# Patient Record
Sex: FEMALE | Race: WHITE | NOT HISPANIC OR LATINO | Employment: PART TIME | URBAN - METROPOLITAN AREA
[De-identification: names, ages, dates, MRNs, and addresses within clinical notes are randomized per-mention and may not be internally consistent; named-entity substitution may affect disease eponyms.]

---

## 2019-11-14 ENCOUNTER — OFFICE VISIT (OUTPATIENT)
Dept: OBSTETRICS AND GYNECOLOGY | Facility: CLINIC | Age: 44
End: 2019-11-14
Payer: COMMERCIAL

## 2019-11-14 VITALS
HEIGHT: 68 IN | SYSTOLIC BLOOD PRESSURE: 100 MMHG | BODY MASS INDEX: 21.95 KG/M2 | WEIGHT: 144.81 LBS | DIASTOLIC BLOOD PRESSURE: 68 MMHG

## 2019-11-14 DIAGNOSIS — R10.2 PELVIC PAIN IN FEMALE: Primary | ICD-10-CM

## 2019-11-14 DIAGNOSIS — N89.8 VAGINAL DISCHARGE: ICD-10-CM

## 2019-11-14 LAB
BILIRUB UR QL STRIP: NEGATIVE
CLARITY UR REFRACT.AUTO: CLEAR
COLOR UR AUTO: YELLOW
GLUCOSE UR QL STRIP: NEGATIVE
HGB UR QL STRIP: NEGATIVE
KETONES UR QL STRIP: NEGATIVE
LEUKOCYTE ESTERASE UR QL STRIP: NEGATIVE
NITRITE UR QL STRIP: NEGATIVE
PH UR STRIP: 5 [PH] (ref 5–8)
PROT UR QL STRIP: NEGATIVE
SP GR UR STRIP: 1.02 (ref 1–1.03)
URN SPEC COLLECT METH UR: NORMAL

## 2019-11-14 PROCEDURE — 99204 OFFICE O/P NEW MOD 45 MIN: CPT | Mod: S$GLB,,, | Performed by: OBSTETRICS & GYNECOLOGY

## 2019-11-14 PROCEDURE — 87801 DETECT AGNT MULT DNA AMPLI: CPT

## 2019-11-14 PROCEDURE — 87481 CANDIDA DNA AMP PROBE: CPT | Mod: 59

## 2019-11-14 PROCEDURE — 87491 CHLMYD TRACH DNA AMP PROBE: CPT | Mod: 59

## 2019-11-14 PROCEDURE — 99204 PR OFFICE/OUTPT VISIT, NEW, LEVL IV, 45-59 MIN: ICD-10-PCS | Mod: S$GLB,,, | Performed by: OBSTETRICS & GYNECOLOGY

## 2019-11-14 PROCEDURE — 87086 URINE CULTURE/COLONY COUNT: CPT

## 2019-11-14 PROCEDURE — 87661 TRICHOMONAS VAGINALIS AMPLIF: CPT

## 2019-11-14 PROCEDURE — 99999 PR PBB SHADOW E&M-NEW PATIENT-LVL III: CPT | Mod: PBBFAC,,, | Performed by: OBSTETRICS & GYNECOLOGY

## 2019-11-14 PROCEDURE — 99203 OFFICE O/P NEW LOW 30 MIN: CPT | Mod: PBBFAC | Performed by: OBSTETRICS & GYNECOLOGY

## 2019-11-14 PROCEDURE — 99999 PR PBB SHADOW E&M-NEW PATIENT-LVL III: ICD-10-PCS | Mod: PBBFAC,,, | Performed by: OBSTETRICS & GYNECOLOGY

## 2019-11-14 PROCEDURE — 81003 URINALYSIS AUTO W/O SCOPE: CPT

## 2019-11-14 RX ORDER — CAFFEINE CITRATE 20 MG/ML
SOLUTION ORAL DAILY
COMMUNITY

## 2019-11-14 RX ORDER — TOPIRAMATE 50 MG/1
50 TABLET, FILM COATED ORAL
COMMUNITY
Start: 2019-01-16

## 2019-11-14 RX ORDER — ATORVASTATIN CALCIUM 20 MG/1
20 TABLET, FILM COATED ORAL
COMMUNITY
Start: 2019-02-26 | End: 2020-02-26

## 2019-11-14 RX ORDER — TOPIRAMATE 100 MG/1
100 TABLET, FILM COATED ORAL
COMMUNITY
Start: 2019-01-16

## 2019-11-14 RX ORDER — LAMOTRIGINE 200 MG/1
200 TABLET ORAL
COMMUNITY
Start: 2019-01-16

## 2019-11-14 RX ORDER — PREDNISONE 5 MG/1
5 TABLET ORAL
COMMUNITY
Start: 2019-01-16

## 2019-11-14 RX ORDER — PROPRANOLOL HYDROCHLORIDE 120 MG/1
120 CAPSULE, EXTENDED RELEASE ORAL
COMMUNITY
Start: 2019-01-16

## 2019-11-14 RX ORDER — SERTRALINE HYDROCHLORIDE 100 MG/1
200 TABLET, FILM COATED ORAL
COMMUNITY
Start: 2019-11-05

## 2019-11-14 RX ORDER — MIDODRINE HYDROCHLORIDE 10 MG/1
TABLET ORAL
COMMUNITY
Start: 2019-10-24

## 2019-11-14 RX ORDER — FLUDROCORTISONE ACETATE 0.1 MG/1
0.2 TABLET ORAL
COMMUNITY
Start: 2019-01-16

## 2019-11-14 RX ORDER — NARATRIPTAN 2.5 MG/1
2.5 TABLET ORAL
COMMUNITY

## 2019-11-14 RX ORDER — ALPRAZOLAM 0.25 MG/1
0.25 TABLET ORAL 3 TIMES DAILY
COMMUNITY

## 2019-11-14 NOTE — PROGRESS NOTES
Urine Culture, Routine No growth         11/14/2019    Bacterial vaginosis DNA Negative   Candida glabrata DNA Negative   Candida krusei DNA Negative   Candida sp Negative   Chlamydia, Amplified DNA Not Detected   N gonorrhoeae, amplified DNA Not Detected   Trichomonas vaginalis Negative     11/14/19 U/S  Uterus:  Size: 7.1 x 2.9 x 3.6 cm  Masses: None  Endometrium: Normal in this pre menopausal patient, measuring 6 mm.  Right ovary:  Size: 3.9 x 1.8 x 3.0 cm  Appearance: Normal  Vascular flow: Normal.  Left ovary:  Size: 3.4 x 1.8 x 3.8 cm  Appearance: Normal  Vascular Flow: Normal  Free Fluid:  None.      Impression     No sonographic abnormality.       PT WAS FINE UNTIL SHE HAD KYLEANA PLACED 8/2019 AND REMOVED 10/2019.  HAD SPOTTING QD AND PAIN. NOW WITH MENSES SEVERE PAINS. HAD A CYST IN THE PAST AND THINKS IT MAY BE LIKE THAT BUT ON BOTH SIDES SHARP AND ALSO SHARP PAINS AT TOP OF THE VAGINA.  FELLS SOMETHING IS NOT RIGHT.   -N/ -V. -D/ -C.  -Uti sx.  -Vag d/c. - BRBPR.  -Dyschezia. UNKNOWN Dysparunia. ++++ Dysmenorhea.      ROS:  GENERAL: No fever, chills, fatigability or weight loss.  VULVAR: No pain, no lesions and no itching.  VAGINAL: No relaxation, no itching, no discharge, no abnormal bleeding and no lesions.  ABDOMEN: No abdominal pain. Denies nausea. Denies vomiting. No diarrhea. No constipation  BREAST: Denies pain. No lumps. No discharge.  URINARY: No incontinence, no nocturia, no frequency and no dysuria.  CARDIOVASCULAR: No chest pain. No shortness of breath. No leg cramps.  NEUROLOGICAL: No headaches. No vision changes.  The remainder of the review of systems was negative.    PE:  General Appearance: normal weight And Well developed. Well nourished. In no acute distress.  Vulva: Lesions: No.  Urethral Meatus: Normal size. Normal location. No lesions. No prolapse.  Urethra: No masses. No tenderness. No prolapse. No scarring.  Bladder: No masses. No tenderness.  Vagina: Mucosa NI:yes discharge yes,  atrophy no, cystocele no or rectocele no.  Cervix: Lesion: no  Stenotic: no Cervical motion tenderness: no  HIGH  Uterus: Uterus size: 5 weeks. Support good. Uterus size: Normal  Adnexa: Masses: No Tenderness: No CDS Nodularity: No  NO PAIN REPRODUCED  Abdomen: normal weight No masses. No tenderness.  CHEST: CTA B  HEART: RRR  EXT: NO EDEMA      PROCEDURES:  UPT-    PLAN:     DIAGNOSIS:  1. Pelvic pain in female    2. Vaginal discharge        MEDICATIONS & ORDERS:  Orders Placed This Encounter    Urine culture    C. trachomatis/N. gonorrhoeae by AMP DNA    Vaginosis Screen by DNA Probe    US Pelvis Comp with Transvag NON-OB (xpd    Urinalysis     20 MIN D/W PT ON CAUSES OF CPP AND W/U    FOLLOW-UP: With me PRN

## 2019-11-15 ENCOUNTER — HOSPITAL ENCOUNTER (OUTPATIENT)
Dept: RADIOLOGY | Facility: OTHER | Age: 44
Discharge: HOME | End: 2019-11-15
Attending: OBSTETRICS & GYNECOLOGY
Payer: COMMERCIAL

## 2019-11-15 DIAGNOSIS — R10.2 PELVIC PAIN IN FEMALE: ICD-10-CM

## 2019-11-15 LAB
BACTERIA UR CULT: NO GROWTH
BACTERIAL VAGINOSIS DNA: NEGATIVE
C TRACH DNA SPEC QL NAA+PROBE: NOT DETECTED
CANDIDA GLABRATA DNA: NEGATIVE
CANDIDA KRUSEI DNA: NEGATIVE
CANDIDA RRNA VAG QL PROBE: NEGATIVE
N GONORRHOEA DNA SPEC QL NAA+PROBE: NOT DETECTED
T VAGINALIS RRNA GENITAL QL PROBE: NEGATIVE

## 2019-11-15 PROCEDURE — 76830 TRANSVAGINAL US NON-OB: CPT | Mod: 26,,, | Performed by: RADIOLOGY

## 2019-11-15 PROCEDURE — 76856 US PELVIS COMP WITH TRANSVAG NON-OB (XPD): ICD-10-PCS | Mod: 26,,, | Performed by: RADIOLOGY

## 2019-11-15 PROCEDURE — 76856 US EXAM PELVIC COMPLETE: CPT | Mod: 26,,, | Performed by: RADIOLOGY

## 2019-11-15 PROCEDURE — 76830 TRANSVAGINAL US NON-OB: CPT | Mod: TC

## 2019-11-15 PROCEDURE — 76830 US PELVIS COMP WITH TRANSVAG NON-OB (XPD): ICD-10-PCS | Mod: 26,,, | Performed by: RADIOLOGY

## 2020-10-27 ENCOUNTER — OFFICE VISIT (OUTPATIENT)
Dept: ALLERGY | Facility: CLINIC | Age: 45
End: 2020-10-27
Payer: COMMERCIAL

## 2020-10-27 DIAGNOSIS — L98.9 SKIN ABNORMALITIES: ICD-10-CM

## 2020-10-27 DIAGNOSIS — J45.20 MILD INTERMITTENT ASTHMA WITHOUT COMPLICATION: ICD-10-CM

## 2020-10-27 DIAGNOSIS — L29.9 PRURITUS: ICD-10-CM

## 2020-10-27 DIAGNOSIS — J31.0 CHRONIC RHINITIS: Primary | ICD-10-CM

## 2020-10-27 PROBLEM — Z79.52 CURRENT CHRONIC USE OF SYSTEMIC STEROIDS: Status: ACTIVE | Noted: 2019-02-26

## 2020-10-27 PROBLEM — E78.5 HYPERLIPIDEMIA: Status: ACTIVE | Noted: 2019-02-26

## 2020-10-27 PROBLEM — E27.1 ADRENAL INSUFFICIENCY (ADDISON'S DISEASE): Status: ACTIVE | Noted: 2019-02-26

## 2020-10-27 PROCEDURE — 99205 OFFICE O/P NEW HI 60 MIN: CPT | Mod: 95,,, | Performed by: STUDENT IN AN ORGANIZED HEALTH CARE EDUCATION/TRAINING PROGRAM

## 2020-10-27 PROCEDURE — 99205 PR OFFICE/OUTPT VISIT, NEW, LEVL V, 60-74 MIN: ICD-10-PCS | Mod: 95,,, | Performed by: STUDENT IN AN ORGANIZED HEALTH CARE EDUCATION/TRAINING PROGRAM

## 2020-10-27 NOTE — PROGRESS NOTES
The patient location is: [place], [place]  The chief complaint leading to consultation is: allergies    Visit type: audiovisual    Face to Face time with patient: 1 hour  1h 30min minutes of total time spent on the encounter, which includes face to face time and non-face to face time preparing to see the patient (eg, review of tests), Obtaining and/or reviewing separately obtained history, Documenting clinical information in the electronic or other health record, Independently interpreting results (not separately reported) and communicating results to the patient/family/caregiver, or Care coordination (not separately reported).     Each patient to whom he or she provides medical services by telemedicine is:  (1) informed of the relationship between the physician and patient and the respective role of any other health care provider with respect to management of the patient; and (2) notified that he or she may decline to receive medical services by telemedicine and may withdraw from such care at any time.     ALLERGY & IMMUNOLOGY CLINIC - INITIAL CONSULTATION     HISTORY OF PRESENT ILLNESS     Patient ID: Diana Mg is a 45 y.o. female    CC: issues with allergies    HPI: Diana Mg is a 45 y.o. female with history of adrenal insufficiency, hyperlipidemia, and depression here for evaluation of allergies.    Patient describes having rhinitis; congestion; PND; and pruritic, watery, red eyes. These symptoms happened year round in [place], she has only been in [place] two years on/off so is unsure what seasons affect her most here, she spends part of the year with her mom in [place]. Spring in general is worse for her. Symptoms are not as bad when AC is on. Antihistamines (allegra, benadryl, zyrtec, claritin) have all helped. She has tried afrin (not more than a few days) and flonase (only felt improvement briefly, did not notice a huge improvement). Has had allergy testing in [place] (did immunotherapy for a few months  and did not notice improvement), this was 20 years ago. In [place] she was told by an allergist that she didn't have allergies. Between the two, she has done skin and blood tests, but does not recall the results. Recalls some trees and cats. Takes allegra daily now (more sometimes) because she is house sitting with a cat (but mostly stays outdoors).    She reports having asthma as a child. Since then, she used albuterol inhaler when she has URI. Now, she uses it more since having COVID, about once a week. More in socrates environments and after exercise. She uses it for cough or tightness in chest.    Noticed that materials made from alpaca caused her hands to get red and itchy in nose and throat when knitting with it, some soaps also cause rash. Has bumps on her legs and is unsure what it is and why she has it. Picks are them secondary to her anxiety. She has had these bumps for about a year. She states these were seen by dermatologist in [facility] who was not concerned.    Few years ago had raised rash, nonerythematuos all over body. Saw a dermatologist, she is unsure what caused it. Does not get urticaria. Biopsy showed nothing infectious or malignant.    Topical oatmeal reaction, gets pruritus and occasional diarrhea. Not with every oatmeal encounter. Similar reaction with tomatoes (lots of fresh tomatoes gives her diarrhea, less with cooking). Takes antihistamine for pruritus.    Diarrhea - usually within 5-10mins of most foods, smoothie consistency, 1-2x.  Happens a couple days a week. Does not notice association with dairy.    Gustatory rhinitis - congestion with foods, not bothersome.    Patient reports being worried that with this constellation, that she may have mastocytosis like her sister (who has both cutaneous and systemic mastocytosis), and isn't sure if her pruritus is organic in etiology or secondary to her anxiety.     REVIEW OF SYSTEMS     17 point ROS negative unless otherwise stated in the HPI.      MEDICAL HISTORY     MedHx:   Marathon's disease, hyperlipidemia, depression    SurgHx: cholecystectomy    Medications:   Current Outpatient Medications on File Prior to Visit   Medication Sig Dispense Refill    ALPRAZolam (XANAX) 0.25 MG tablet Take 0.25 mg by mouth 3 (three) times daily.      atorvastatin (LIPITOR) 20 MG tablet Take 20 mg by mouth.      caffeine citrate (CAFCIT) 60 mg/3 mL (20 mg/mL) oral solution Take by mouth once daily.      fludrocortisone (FLORINEF) 0.1 mg Tab Take 0.2 mg by mouth.      lamoTRIgine (LAMICTAL) 200 MG tablet Take 200 mg by mouth.      midodrine (PROAMATINE) 10 MG tablet       naratriptan (AMERGE) 2.5 MG tablet Take 2.5 mg by mouth as needed for Migraine. 2.5 mg at onset of headache, may repeat in 4 hours if needed      predniSONE (DELTASONE) 5 MG tablet Take 5 mg by mouth.      propranolol (INDERAL [place]) 120 MG 24 hr capsule Take 120 mg by mouth.      sertraline (ZOLOFT) 100 MG tablet 200 mg.      topiramate (TOPAMAX) 100 MG tablet Take 100 mg by mouth.      topiramate (TOPAMAX) 50 MG tablet Take 50 mg by mouth.       No current facility-administered medications on file prior to visit.      Naratriptan prn  Potassium citrate, not caffeine citrate    Vaccines: up to date, due for flu shot    H/o Asthma: see HPI  H/o Eczema: yes on foot, keeps it moisturized and has steroid ointment prn  H/o Rhinitis: see HPI  Oral Allergy: denies  Food Allergy: denies  Venom Allergy: denies  Latex Allergy: denies  Bad reaction to poison ivy  Drug Allergies:   Review of patient's allergies indicates:   Allergen Reactions    Prochlorperazine edisylate Other (See Comments)    - had akathisia with this medicine    Env/Occ:   Eder: wood here, wood and carpet in [place]  Water Damage: no  Pets: cats  Central Air/Heat: not in [place]  Visible mold growth: no (has noticed symptoms with mold)  Dust mite covers in place: no    Infection Hx: frequent sinus infections if allergies are  severe    SocHx:   ETOH: no  Tobacco: no  Illicit Drug Use: no  Occupation: on disability    FamHx:   Asthma: no  Allergic rhinitis: sister  Food Allergy: no     PHYSICAL EXAM     GENERAL: AAO, NAD, well-appearing, cooperative  EYES: no conjunctival injection  LUNGS: no increased WOB     CHART REVIEW     FM note reviewed     ASSESSMENT & PLAN     Diana Mg is a 45 y.o. female with     Chronic rhinitis  - evaluate for environmental allergies with serum IgE testing  - patient reports control of symptoms with antihistamines and did not notice benefit in the past with intranasal fluticasone  - if symptoms worsen, suggested trying intranasal fluticasone again in conjunction with antihistamine, if there is no benefit can discuss other options    Pruritus  - patient reports pruritus frequently despite taking oral steroid, fludricortisone, and midodrine  - will obtain tryptase to evaluate for mastocytosis, though currently low on the differential  - Patient will trial one week course of high dose antihistamines (allegra 2 tabs BID) to evaluate if etiology is histaminergic    Intermittent Asthma  - patient endorses exercise-induced asthma, exacerbation from dust, and otherwise which requires albuterol at least once a week. Endorses this being worse since having COVID  - recommended 2 puffs albuterol 15mins prior to exercise to prevent exercise-induced asthma  - patient reports this being well controlled, however will need to evaluate this further in the future  - based on newer guidelines, may also benefit from prn inhaled corticosteroid    Sensitivity to foods  - discussed that food testing will not be helpful  - recommend eating or avoiding based on preference, consider using antihistamine for symptoms    Gustatory rhinitis  - not bothersome, no medications needed at this time    Concern for skin abnormalities  - patient will send pictures through portal to evaluate  - reassuring that this was seen by dermatology who did  not note anything worrisome    Discussed with: Dr. Bradley  Follow up: will discuss after lab results     Chantal Huber MD  [facility] Allergy and Immunology Fellow

## 2020-11-16 ENCOUNTER — LAB VISIT (OUTPATIENT)
Dept: LAB | Facility: HOSPITAL | Age: 45
End: 2020-11-16
Attending: STUDENT IN AN ORGANIZED HEALTH CARE EDUCATION/TRAINING PROGRAM
Payer: MEDICARE

## 2020-11-16 DIAGNOSIS — J31.0 CHRONIC RHINITIS: ICD-10-CM

## 2020-11-16 DIAGNOSIS — L29.9 PRURITUS: ICD-10-CM

## 2020-11-16 PROCEDURE — 86003 ALLG SPEC IGE CRUDE XTRC EA: CPT | Mod: 59

## 2020-11-16 PROCEDURE — 86003 ALLG SPEC IGE CRUDE XTRC EA: CPT

## 2020-11-16 PROCEDURE — 83520 IMMUNOASSAY QUANT NOS NONAB: CPT

## 2020-11-16 PROCEDURE — 36415 COLL VENOUS BLD VENIPUNCTURE: CPT | Mod: PO

## 2020-11-18 LAB — TRYPTASE LEVEL: 3.6 NG/ML

## 2020-11-20 LAB
A ALTERNATA IGE QN: <0.1 KU/L
A FUMIGATUS IGE QN: <0.1 KU/L
BERMUDA GRASS IGE QN: <0.1 KU/L
CAT DANDER IGE QN: <0.1 KU/L
CEDAR IGE QN: <0.1 KU/L
D FARINAE IGE QN: <0.1 KU/L
D PTERONYSS IGE QN: <0.1 KU/L
DEPRECATED A ALTERNATA IGE RAST QL: NORMAL
DEPRECATED A FUMIGATUS IGE RAST QL: NORMAL
DEPRECATED BERMUDA GRASS IGE RAST QL: NORMAL
DEPRECATED CAT DANDER IGE RAST QL: NORMAL
DEPRECATED CEDAR IGE RAST QL: NORMAL
DEPRECATED D FARINAE IGE RAST QL: NORMAL
DEPRECATED D PTERONYSS IGE RAST QL: NORMAL
DEPRECATED DOG DANDER IGE RAST QL: NORMAL
DEPRECATED ELDER IGE RAST QL: NORMAL
DEPRECATED ENGL PLANTAIN IGE RAST QL: NORMAL
DEPRECATED PECAN/HICK TREE IGE RAST QL: NORMAL
DEPRECATED ROACH IGE RAST QL: NORMAL
DEPRECATED TIMOTHY IGE RAST QL: NORMAL
DEPRECATED WEST RAGWEED IGE RAST QL: NORMAL
DEPRECATED WHITE OAK IGE RAST QL: NORMAL
DOG DANDER IGE QN: <0.1 KU/L
ELDER IGE QN: <0.1 KU/L
ENGL PLANTAIN IGE QN: <0.1 KU/L
PECAN/HICK TREE IGE QN: <0.1 KU/L
ROACH IGE QN: <0.1 KU/L
TIMOTHY IGE QN: <0.1 KU/L
WEST RAGWEED IGE QN: <0.1 KU/L
WHITE OAK IGE QN: <0.1 KU/L

## 2021-06-10 ENCOUNTER — IMMUNIZATION (OUTPATIENT)
Dept: PRIMARY CARE CLINIC | Facility: CLINIC | Age: 46
End: 2021-06-10
Payer: MEDICARE

## 2021-06-10 DIAGNOSIS — Z23 NEED FOR VACCINATION: Primary | ICD-10-CM

## 2021-06-10 PROCEDURE — 91300 COVID-19, MRNA, LNP-S, PF, 30 MCG/0.3 ML DOSE VACCINE: CPT | Mod: PBBFAC,PN

## 2021-07-01 ENCOUNTER — IMMUNIZATION (OUTPATIENT)
Dept: PRIMARY CARE CLINIC | Facility: CLINIC | Age: 46
End: 2021-07-01
Payer: MEDICARE

## 2021-07-01 DIAGNOSIS — Z23 NEED FOR VACCINATION: Primary | ICD-10-CM

## 2023-07-11 ENCOUNTER — APPOINTMENT (RX ONLY)
Dept: URBAN - METROPOLITAN AREA CLINIC 12 | Facility: CLINIC | Age: 48
Setting detail: DERMATOLOGY
End: 2023-07-11

## 2023-07-11 DIAGNOSIS — L30.9 DERMATITIS, UNSPECIFIED: ICD-10-CM

## 2023-07-11 PROCEDURE — 11104 PUNCH BX SKIN SINGLE LESION: CPT

## 2023-07-11 PROCEDURE — 11105 PUNCH BX SKIN EA SEP/ADDL: CPT

## 2023-07-11 PROCEDURE — ? OTHER

## 2023-07-11 PROCEDURE — ? BIOPSY BY PUNCH METHOD

## 2023-07-11 ASSESSMENT — LOCATION DETAILED DESCRIPTION DERM
LOCATION DETAILED: LEFT KNEE
LOCATION DETAILED: LEFT ANTERIOR DISTAL THIGH

## 2023-07-11 ASSESSMENT — LOCATION ZONE DERM: LOCATION ZONE: LEG

## 2023-07-11 ASSESSMENT — BSA RASH: BSA RASH: 60

## 2023-07-11 ASSESSMENT — LOCATION SIMPLE DESCRIPTION DERM
LOCATION SIMPLE: LEFT KNEE
LOCATION SIMPLE: LEFT THIGH

## 2023-07-11 ASSESSMENT — ITCH NUMERIC RATING SCALE: HOW SEVERE IS YOUR ITCHING?: 4

## 2023-07-11 NOTE — HPI: RASH
What Type Of Note Output Would You Prefer (Optional)?: Bullet Format
How Severe Is Your Rash?: moderate
Is This A New Presentation, Or A Follow-Up?: Rash
Additional History: Pt states that she had a Telehealth visit and they said she had scabies. This was in the beginning of June. Pt states she is a diagnosed , all over body. On prednisone. Declines fbe.

## 2023-07-11 NOTE — PROCEDURE: OTHER
Note Text (......Xxx Chief Complaint.): This diagnosis correlates with the
Render Risk Assessment In Note?: no
Other (Free Text): 48 yowf with ~1-2 month hx of purplish and erythematous excoriated papules widespread on legs, feet, hands, wrists, forearms and some isolated lesions on abdomen.  back is clear. \\nwas advised it was scabies and treated multiple times with topical permethrin.  she has seen improvement with tea tree cream.  pt has numerous underlying condisions like adreneal insufficiency (5 mg pred every day) and anxiety, depression and ahd using dex-amph.  pt’s sister with systemic mastocytosis.
Detail Level: Zone

## 2023-07-11 NOTE — PROCEDURE: BIOPSY BY PUNCH METHOD
Detail Level: Detailed
Was A Bandage Applied: Yes
Punch Size In Mm: 4
Size Of Lesion In Cm (Optional): 0
Depth Of Punch Biopsy: dermis
Biopsy Type: H and E
Anesthesia Type: 1% lidocaine with epinephrine
Anesthesia Volume In Cc (Will Not Render If 0): 0.5
Hemostasis: None
Epidermal Sutures: 4-0 Nylon
Wound Care: Petrolatum
Dressing: bandage
Patient Will Remove Sutures At Home?: No
Lab: [id number]
Path Notes Override (Will Replace All Of The Above Text): 48 yowf with ~1-2 month hx of purplish and erythematous excoriated papules widespread on legs, feet, hands, wrists, forearms and some isolated lesions on abdomen.  back is clear. \\nwas advised it was scabies and treated multiple times with topical permethrin.  she has seen improvement with tea tree cream.  pt has numerous underlying condisions like adreneal insufficiency (5 mg pred every day) and anxiety, depression and ahd using dex-amph.  pt’s sister with systemic mastocytosis.  reports crawling sensation on skin and states that she has seen scabies on her skin.
Consent: Verbal consent was obtained and risks were reviewed including but not limited to scarring, infection, bleeding, scabbing, incomplete removal, nerve damage and allergy to anesthesia.
Post-Care Instructions: I reviewed with the patient in detail post-care instructions. Patient is to keep the biopsy site dry overnight, and then apply bacitracin twice daily until healed. Patient may apply hydrogen peroxide soaks to remove any crusting.
Home Suture Removal Text: Patient was provided a home suture removal kit and will remove their sutures at home.  If they have any questions or difficulties they will call the office.
Notification Instructions: Patient will be notified of biopsy results. However, patient instructed to call the office if not contacted within 2 weeks.
Billing Type: Third-Party Bill
Information: Selecting Yes will display possible errors in your note based on the variables you have selected. This validation is only offered as a suggestion for you. PLEASE NOTE THAT THE VALIDATION TEXT WILL BE REMOVED WHEN YOU FINALIZE YOUR NOTE. IF YOU WANT TO FAX A PRELIMINARY NOTE YOU WILL NEED TO TOGGLE THIS TO 'NO' IF YOU DO NOT WANT IT IN YOUR FAXED NOTE.
Punch Size In Mm: 3
Biopsy Type: DIF (perilesional)